# Patient Record
Sex: FEMALE | Race: WHITE | NOT HISPANIC OR LATINO | Employment: OTHER | URBAN - METROPOLITAN AREA
[De-identification: names, ages, dates, MRNs, and addresses within clinical notes are randomized per-mention and may not be internally consistent; named-entity substitution may affect disease eponyms.]

---

## 2017-08-22 ENCOUNTER — PREPPED CHART (OUTPATIENT)
Dept: URBAN - METROPOLITAN AREA CLINIC 39 | Facility: CLINIC | Age: 82
End: 2017-08-22

## 2018-02-21 NOTE — PROCEDURE NOTE: SURGICAL
<p>Prior to commencing surgery patient identification, surgical procedure, site, and side were confirmed by Dr. Tuyet Quigley. Following topical proparacaine anesthesia, the patient was positioned at the YAG laser, a contact lens coupled to the cornea with methylcellulose and an axial posterior capsulotomy performed without complication using 3.0 Mj x 49. Excess methylcellulose was washed from the eye, one drop of Alphagan was instilled and the patient returned to the holding area having tolerated the procedure well and without complication. </p><p>MRN [id number]</p>

## 2018-04-12 ENCOUNTER — ESTABLISHED COMPREHENSIVE EXAM (OUTPATIENT)
Dept: URBAN - METROPOLITAN AREA CLINIC 39 | Facility: CLINIC | Age: 83
End: 2018-04-12

## 2018-04-12 DIAGNOSIS — H35.3130: ICD-10-CM

## 2018-04-12 DIAGNOSIS — H35.373: ICD-10-CM

## 2018-04-12 DIAGNOSIS — H43.813: ICD-10-CM

## 2018-04-12 DIAGNOSIS — H26.492: ICD-10-CM

## 2018-04-12 PROCEDURE — 1036F TOBACCO NON-USER: CPT

## 2018-04-12 PROCEDURE — 92014 COMPRE OPH EXAM EST PT 1/>: CPT

## 2018-04-12 PROCEDURE — 92134 CPTRZ OPH DX IMG PST SGM RTA: CPT

## 2018-04-12 PROCEDURE — 2019F DILATED MACUL EXAM DONE: CPT

## 2018-04-12 PROCEDURE — 92015 DETERMINE REFRACTIVE STATE: CPT

## 2018-04-12 PROCEDURE — 4177F TALK PT/CRGVR RE AREDS PREV: CPT

## 2018-04-12 PROCEDURE — G8785 BP SCRN NO PERF AT INTERVAL: HCPCS

## 2018-04-12 PROCEDURE — G8427 DOCREV CUR MEDS BY ELIG CLIN: HCPCS

## 2018-04-12 ASSESSMENT — VISUAL ACUITY
OS_SC: J8
OD_SC: J1+
OS_SC: 20/50-1

## 2018-04-12 ASSESSMENT — TONOMETRY
OS_IOP_MMHG: 15
OD_IOP_MMHG: 15

## 2018-07-25 ENCOUNTER — ESTABLISHED COMPREHENSIVE EXAM (OUTPATIENT)
Dept: URBAN - METROPOLITAN AREA CLINIC 39 | Facility: CLINIC | Age: 83
End: 2018-07-25

## 2018-07-25 DIAGNOSIS — H10.12: ICD-10-CM

## 2018-07-25 DIAGNOSIS — H01.135: ICD-10-CM

## 2018-07-25 PROCEDURE — G8427 DOCREV CUR MEDS BY ELIG CLIN: HCPCS

## 2018-07-25 PROCEDURE — G8785 BP SCRN NO PERF AT INTERVAL: HCPCS

## 2018-07-25 PROCEDURE — 1036F TOBACCO NON-USER: CPT

## 2018-07-25 PROCEDURE — 92012 INTRM OPH EXAM EST PATIENT: CPT

## 2018-07-25 ASSESSMENT — TONOMETRY
OS_IOP_MMHG: 12
OD_IOP_MMHG: 18

## 2018-07-25 ASSESSMENT — VISUAL ACUITY
OD_PH: 20/50
OS_SC: 20/40
OD_SC: 20/200

## 2019-02-13 ENCOUNTER — EST. PATIENT EMERGENCY (OUTPATIENT)
Dept: URBAN - METROPOLITAN AREA CLINIC 39 | Facility: CLINIC | Age: 84
End: 2019-02-13

## 2019-02-13 DIAGNOSIS — H00.014: ICD-10-CM

## 2019-02-13 PROCEDURE — 92012 INTRM OPH EXAM EST PATIENT: CPT

## 2019-02-13 ASSESSMENT — TONOMETRY
OS_IOP_MMHG: 16
OD_IOP_MMHG: 14

## 2019-02-13 ASSESSMENT — VISUAL ACUITY
OS_SC: 20/40
OD_SC: CF 6FT

## 2019-04-11 ENCOUNTER — ESTABLISHED COMPREHENSIVE EXAM (OUTPATIENT)
Dept: URBAN - METROPOLITAN AREA CLINIC 39 | Facility: CLINIC | Age: 84
End: 2019-04-11

## 2019-04-11 DIAGNOSIS — H26.492: ICD-10-CM

## 2019-04-11 DIAGNOSIS — H35.3130: ICD-10-CM

## 2019-04-11 DIAGNOSIS — H43.813: ICD-10-CM

## 2019-04-11 DIAGNOSIS — H10.12: ICD-10-CM

## 2019-04-11 DIAGNOSIS — H35.373: ICD-10-CM

## 2019-04-11 DIAGNOSIS — H01.135: ICD-10-CM

## 2019-04-11 PROCEDURE — 92014 COMPRE OPH EXAM EST PT 1/>: CPT

## 2019-04-11 PROCEDURE — 92134 CPTRZ OPH DX IMG PST SGM RTA: CPT

## 2019-04-11 PROCEDURE — 92015 DETERMINE REFRACTIVE STATE: CPT

## 2019-04-11 ASSESSMENT — VISUAL ACUITY
OS_SC: <J12
OD_SC: CF 6FT
OU_SC: 20/40-2
OS_SC: 20/40-2
OU_SC: J1
OD_SC: J1

## 2020-04-22 ENCOUNTER — FOLLOW UP (OUTPATIENT)
Dept: URBAN - METROPOLITAN AREA CLINIC 39 | Facility: CLINIC | Age: 85
End: 2020-04-22

## 2020-04-22 DIAGNOSIS — H52.4: ICD-10-CM

## 2020-04-22 DIAGNOSIS — H43.813: ICD-10-CM

## 2020-04-22 DIAGNOSIS — H52.203: ICD-10-CM

## 2020-04-22 DIAGNOSIS — H26.492: ICD-10-CM

## 2020-04-22 DIAGNOSIS — H52.11: ICD-10-CM

## 2020-04-22 DIAGNOSIS — H35.373: ICD-10-CM

## 2020-04-22 DIAGNOSIS — H35.3130: ICD-10-CM

## 2020-04-22 PROCEDURE — 92015 DETERMINE REFRACTIVE STATE: CPT

## 2020-04-22 PROCEDURE — 92134 CPTRZ OPH DX IMG PST SGM RTA: CPT

## 2020-04-22 PROCEDURE — 92014 COMPRE OPH EXAM EST PT 1/>: CPT

## 2020-04-22 ASSESSMENT — VISUAL ACUITY
OD_SC: J1 @ 11"
OU_SC: J1 @ 11"
OU_SC: 20/40-2
OD_SC: 20/400
OS_SC: 20/40-2
OS_SC: J12

## 2020-04-22 ASSESSMENT — TONOMETRY
OD_IOP_MMHG: 16
OS_IOP_MMHG: 13

## 2021-04-06 ENCOUNTER — ESTABLISHED COMPREHENSIVE EXAM (OUTPATIENT)
Dept: URBAN - METROPOLITAN AREA CLINIC 39 | Facility: CLINIC | Age: 86
End: 2021-04-06

## 2021-04-06 DIAGNOSIS — H35.3130: ICD-10-CM

## 2021-04-06 DIAGNOSIS — H26.492: ICD-10-CM

## 2021-04-06 DIAGNOSIS — H35.373: ICD-10-CM

## 2021-04-06 DIAGNOSIS — H43.813: ICD-10-CM

## 2021-04-06 PROCEDURE — 92015 DETERMINE REFRACTIVE STATE: CPT

## 2021-04-06 PROCEDURE — 92014 COMPRE OPH EXAM EST PT 1/>: CPT

## 2021-04-06 PROCEDURE — 92134 CPTRZ OPH DX IMG PST SGM RTA: CPT

## 2021-04-06 ASSESSMENT — KERATOMETRY
OS_K2POWER_DIOPTERS: 45.75
OD_K1POWER_DIOPTERS: 45.50
OD_AXISANGLE_DEGREES: 81
OS_K1POWER_DIOPTERS: 45.25
OD_K2POWER_DIOPTERS: 45.75
OD_AXISANGLE2_DEGREES: 171
OS_AXISANGLE2_DEGREES: 17
OS_AXISANGLE_DEGREES: 107

## 2021-04-06 ASSESSMENT — VISUAL ACUITY
OU_SC: 20/40-2
OU_CC: J1
OU_SC: J1+
OS_SC: 20/50
OD_SC: J1+
OD_CC: J1
OS_SC: <J12
OS_CC: J1
OD_SC: CF 6FT

## 2021-04-06 ASSESSMENT — TONOMETRY
OD_IOP_MMHG: 18
OS_IOP_MMHG: 13

## 2021-10-13 ENCOUNTER — EST. PATIENT EMERGENCY (OUTPATIENT)
Dept: URBAN - METROPOLITAN AREA CLINIC 42 | Facility: CLINIC | Age: 86
End: 2021-10-13

## 2021-10-13 DIAGNOSIS — H35.3130: ICD-10-CM

## 2021-10-13 PROCEDURE — 99211 OFF/OP EST MAY X REQ PHY/QHP: CPT

## 2021-10-13 ASSESSMENT — KERATOMETRY
OS_K1POWER_DIOPTERS: 45.25
OD_K1POWER_DIOPTERS: 45.50
OD_AXISANGLE2_DEGREES: 171
OS_K2POWER_DIOPTERS: 45.75
OD_AXISANGLE_DEGREES: 81
OD_K2POWER_DIOPTERS: 45.75
OS_AXISANGLE2_DEGREES: 17
OS_AXISANGLE_DEGREES: 107

## 2021-10-13 ASSESSMENT — TONOMETRY
OD_IOP_MMHG: 17
OS_IOP_MMHG: 14

## 2021-10-13 ASSESSMENT — VISUAL ACUITY
OU_SC: 20/40-1
OS_SC: 20/40-1
OD_SC: CF 5FT

## 2022-03-01 ENCOUNTER — COMPREHENSIVE EXAM (OUTPATIENT)
Dept: URBAN - METROPOLITAN AREA CLINIC 39 | Facility: CLINIC | Age: 87
End: 2022-03-01

## 2022-03-01 DIAGNOSIS — H35.373: ICD-10-CM

## 2022-03-01 DIAGNOSIS — H52.4: ICD-10-CM

## 2022-03-01 DIAGNOSIS — H52.11: ICD-10-CM

## 2022-03-01 DIAGNOSIS — H35.3130: ICD-10-CM

## 2022-03-01 DIAGNOSIS — H52.223: ICD-10-CM

## 2022-03-01 DIAGNOSIS — H26.492: ICD-10-CM

## 2022-03-01 DIAGNOSIS — H43.813: ICD-10-CM

## 2022-03-01 PROCEDURE — 92015 DETERMINE REFRACTIVE STATE: CPT

## 2022-03-01 PROCEDURE — 92014 COMPRE OPH EXAM EST PT 1/>: CPT

## 2022-03-01 PROCEDURE — 92250 FUNDUS PHOTOGRAPHY W/I&R: CPT

## 2022-03-01 ASSESSMENT — KERATOMETRY
OS_K1POWER_DIOPTERS: 45.25
OS_AXISANGLE_DEGREES: 107
OD_AXISANGLE_DEGREES: 81
OD_AXISANGLE2_DEGREES: 171
OS_K2POWER_DIOPTERS: 45.75
OS_AXISANGLE2_DEGREES: 17
OD_K1POWER_DIOPTERS: 45.50
OD_K2POWER_DIOPTERS: 45.75

## 2022-03-01 ASSESSMENT — TONOMETRY
OD_IOP_MMHG: 17
OS_IOP_MMHG: 16

## 2022-03-01 ASSESSMENT — VISUAL ACUITY
OU_SC: 20/40-2
OD_SC: 20/70
OS_SC: J10
OS_SC: 20/40-2
OU_SC: J1+
OD_SC: J1+

## 2023-01-06 ENCOUNTER — ESTABLISHED PATIENT (OUTPATIENT)
Dept: URBAN - METROPOLITAN AREA CLINIC 39 | Facility: CLINIC | Age: 88
End: 2023-01-06

## 2023-01-06 DIAGNOSIS — H35.3130: ICD-10-CM

## 2023-01-06 DIAGNOSIS — H52.11: ICD-10-CM

## 2023-01-06 DIAGNOSIS — H26.492: ICD-10-CM

## 2023-01-06 DIAGNOSIS — H35.373: ICD-10-CM

## 2023-01-06 DIAGNOSIS — H52.4: ICD-10-CM

## 2023-01-06 DIAGNOSIS — H43.813: ICD-10-CM

## 2023-01-06 PROCEDURE — 92015 DETERMINE REFRACTIVE STATE: CPT

## 2023-01-06 PROCEDURE — 92134 CPTRZ OPH DX IMG PST SGM RTA: CPT

## 2023-01-06 PROCEDURE — 92014 COMPRE OPH EXAM EST PT 1/>: CPT

## 2023-01-06 ASSESSMENT — KERATOMETRY
OS_AXISANGLE_DEGREES: 107
OD_K1POWER_DIOPTERS: 45.50
OS_K1POWER_DIOPTERS: 45.25
OD_AXISANGLE_DEGREES: 81
OS_AXISANGLE2_DEGREES: 17
OD_K2POWER_DIOPTERS: 45.75
OS_K2POWER_DIOPTERS: 45.75
OD_AXISANGLE2_DEGREES: 171

## 2023-01-06 ASSESSMENT — VISUAL ACUITY
OD_SC: J4
OS_SC: J12
OS_SC: 20/70
OD_SC: 20/400

## 2023-01-06 ASSESSMENT — TONOMETRY
OD_IOP_MMHG: 24
OS_IOP_MMHG: 16

## 2023-04-13 NOTE — PATIENT DISCUSSION
Discussed the risks/benefits of laser capsulotomy. Laser recommended. Patient elects to proceed. DISPLAY PLAN FREE TEXT

## 2023-06-06 ENCOUNTER — FOLLOW UP (OUTPATIENT)
Dept: URBAN - METROPOLITAN AREA CLINIC 39 | Facility: CLINIC | Age: 88
End: 2023-06-06

## 2023-06-06 DIAGNOSIS — H40.013: ICD-10-CM

## 2023-06-06 PROCEDURE — 92083 EXTENDED VISUAL FIELD XM: CPT

## 2023-06-06 PROCEDURE — 92250 FUNDUS PHOTOGRAPHY W/I&R: CPT

## 2023-06-06 PROCEDURE — 92012 INTRM OPH EXAM EST PATIENT: CPT

## 2023-06-06 RX ORDER — LATANOPROST 50 UG/ML
1 SOLUTION/ DROPS OPHTHALMIC EVERY EVENING
Start: 2023-06-06

## 2023-06-06 ASSESSMENT — KERATOMETRY
OS_AXISANGLE2_DEGREES: 17
OD_AXISANGLE_DEGREES: 81
OS_K2POWER_DIOPTERS: 45.75
OS_K1POWER_DIOPTERS: 45.25
OD_AXISANGLE2_DEGREES: 171
OD_K1POWER_DIOPTERS: 45.50
OS_AXISANGLE_DEGREES: 107
OD_K2POWER_DIOPTERS: 45.75

## 2023-06-06 ASSESSMENT — VISUAL ACUITY
OD_SC: 20/400
OD_SC: J1
OS_SC: 20/40-2

## 2023-06-06 ASSESSMENT — TONOMETRY
OD_IOP_MMHG: 25
OS_IOP_MMHG: 16

## 2023-06-13 ENCOUNTER — FOLLOW UP (OUTPATIENT)
Dept: URBAN - METROPOLITAN AREA CLINIC 39 | Facility: CLINIC | Age: 88
End: 2023-06-13

## 2023-06-13 DIAGNOSIS — H40.013: ICD-10-CM

## 2023-06-13 PROCEDURE — 92012 INTRM OPH EXAM EST PATIENT: CPT

## 2023-06-13 ASSESSMENT — KERATOMETRY
OS_AXISANGLE2_DEGREES: 17
OD_K1POWER_DIOPTERS: 45.50
OS_K2POWER_DIOPTERS: 45.75
OS_K1POWER_DIOPTERS: 45.25
OD_K2POWER_DIOPTERS: 45.75
OD_AXISANGLE_DEGREES: 81
OD_AXISANGLE2_DEGREES: 171
OS_AXISANGLE_DEGREES: 107

## 2023-06-13 ASSESSMENT — VISUAL ACUITY
OU_SC: 20/30-2
OD_SC: 20/400
OS_SC: 20/30-2

## 2023-06-13 ASSESSMENT — TONOMETRY
OS_IOP_MMHG: 16
OD_IOP_MMHG: 22

## 2023-06-20 ENCOUNTER — CONSULTATION/EVALUATION (OUTPATIENT)
Dept: URBAN - METROPOLITAN AREA CLINIC 39 | Facility: CLINIC | Age: 88
End: 2023-06-20

## 2023-06-20 DIAGNOSIS — H40.1131: ICD-10-CM

## 2023-06-20 PROCEDURE — 92020 GONIOSCOPY: CPT

## 2023-06-20 PROCEDURE — 92004 COMPRE OPH EXAM NEW PT 1/>: CPT

## 2023-06-20 PROCEDURE — 92250 FUNDUS PHOTOGRAPHY W/I&R: CPT

## 2023-06-20 PROCEDURE — 65855 TRABECULOPLASTY LASER SURG: CPT

## 2023-06-20 PROCEDURE — 76514 ECHO EXAM OF EYE THICKNESS: CPT

## 2023-06-20 RX ORDER — PREDNISOLONE ACETATE 10 MG/ML: 1 SUSPENSION/ DROPS OPHTHALMIC

## 2023-06-20 ASSESSMENT — KERATOMETRY
OS_AXISANGLE2_DEGREES: 17
OD_K2POWER_DIOPTERS: 45.75
OS_AXISANGLE_DEGREES: 107
OS_K2POWER_DIOPTERS: 45.75
OS_K1POWER_DIOPTERS: 45.25
OD_AXISANGLE2_DEGREES: 171
OD_K1POWER_DIOPTERS: 45.50
OD_AXISANGLE_DEGREES: 81

## 2023-06-20 ASSESSMENT — TONOMETRY
OS_IOP_MMHG: 16
OD_IOP_MMHG: 24

## 2023-06-20 ASSESSMENT — VISUAL ACUITY
OD_SC: 20/400
OS_SC: 20/30-1

## 2023-06-20 ASSESSMENT — PACHYMETRY
OD_CT_UM: 516
OS_CT_UM: 521

## 2023-07-25 ENCOUNTER — FOLLOW UP (OUTPATIENT)
Dept: URBAN - METROPOLITAN AREA CLINIC 39 | Facility: CLINIC | Age: 88
End: 2023-07-25

## 2023-07-25 DIAGNOSIS — H40.1131: ICD-10-CM

## 2023-07-25 PROCEDURE — 92012 INTRM OPH EXAM EST PATIENT: CPT

## 2023-07-25 ASSESSMENT — TONOMETRY
OS_IOP_MMHG: 14
OD_IOP_MMHG: 19

## 2023-07-25 ASSESSMENT — KERATOMETRY
OS_K1POWER_DIOPTERS: 45.25
OD_AXISANGLE_DEGREES: 81
OS_AXISANGLE2_DEGREES: 17
OD_K1POWER_DIOPTERS: 45.50
OS_AXISANGLE_DEGREES: 107
OS_K2POWER_DIOPTERS: 45.75
OD_AXISANGLE2_DEGREES: 171
OD_K2POWER_DIOPTERS: 45.75

## 2023-07-25 ASSESSMENT — VISUAL ACUITY
OD_SC: 20/400
OS_SC: 20/30-2

## 2023-10-24 ENCOUNTER — FOLLOW UP (OUTPATIENT)
Dept: URBAN - METROPOLITAN AREA CLINIC 39 | Facility: CLINIC | Age: 88
End: 2023-10-24

## 2023-10-24 DIAGNOSIS — H40.1131: ICD-10-CM

## 2023-10-24 PROCEDURE — 92133 CPTRZD OPH DX IMG PST SGM ON: CPT | Mod: 25

## 2023-10-24 PROCEDURE — 92012 INTRM OPH EXAM EST PATIENT: CPT

## 2023-10-24 ASSESSMENT — KERATOMETRY
OS_AXISANGLE_DEGREES: 107
OS_AXISANGLE2_DEGREES: 17
OD_K1POWER_DIOPTERS: 45.50
OS_K1POWER_DIOPTERS: 45.25
OS_K2POWER_DIOPTERS: 45.75
OD_AXISANGLE2_DEGREES: 171
OD_K2POWER_DIOPTERS: 45.75
OD_AXISANGLE_DEGREES: 81

## 2023-10-24 ASSESSMENT — VISUAL ACUITY
OD_CC: 20/40
OD_PH: 20/30-2
OS_CC: 20/40-2

## 2023-10-24 ASSESSMENT — TONOMETRY
OS_IOP_MMHG: 12
OD_IOP_MMHG: 14

## 2024-03-19 ENCOUNTER — FOLLOW UP (OUTPATIENT)
Dept: URBAN - METROPOLITAN AREA CLINIC 39 | Facility: CLINIC | Age: 89
End: 2024-03-19

## 2024-03-19 DIAGNOSIS — H40.1131: ICD-10-CM

## 2024-03-19 PROCEDURE — 92012 INTRM OPH EXAM EST PATIENT: CPT

## 2024-03-19 ASSESSMENT — KERATOMETRY
OD_K2POWER_DIOPTERS: 45.75
OD_AXISANGLE2_DEGREES: 171
OD_AXISANGLE_DEGREES: 81
OD_K1POWER_DIOPTERS: 45.50
OS_K2POWER_DIOPTERS: 45.75
OS_AXISANGLE_DEGREES: 107
OS_AXISANGLE2_DEGREES: 17
OS_K1POWER_DIOPTERS: 45.25

## 2024-03-19 ASSESSMENT — VISUAL ACUITY
OU_SC: 20/70-1
OS_SC: 20/70-1
OD_SC: 20/400
OD_PH: 20/80-1

## 2024-03-19 ASSESSMENT — TONOMETRY
OD_IOP_MMHG: 17
OS_IOP_MMHG: 14

## 2024-07-18 ENCOUNTER — FOLLOW UP (OUTPATIENT)
Dept: URBAN - METROPOLITAN AREA CLINIC 39 | Facility: CLINIC | Age: 89
End: 2024-07-18

## 2024-07-18 DIAGNOSIS — H40.1131: ICD-10-CM

## 2024-07-18 DIAGNOSIS — H35.3130: ICD-10-CM

## 2024-07-18 PROCEDURE — 92012 INTRM OPH EXAM EST PATIENT: CPT

## 2024-07-18 PROCEDURE — 92250 FUNDUS PHOTOGRAPHY W/I&R: CPT | Mod: 25

## 2024-07-18 ASSESSMENT — KERATOMETRY
OS_K2POWER_DIOPTERS: 45.75
OD_AXISANGLE_DEGREES: 81
OD_AXISANGLE2_DEGREES: 171
OS_AXISANGLE_DEGREES: 107
OD_K1POWER_DIOPTERS: 45.50
OD_K2POWER_DIOPTERS: 45.75
OS_AXISANGLE2_DEGREES: 17
OS_K1POWER_DIOPTERS: 45.25

## 2024-07-18 ASSESSMENT — VISUAL ACUITY
OD_SC: 20/400
OS_PH: 20/50+1
OD_PH: 20/60-1
OS_SC: 20/50-2

## 2024-07-18 ASSESSMENT — TONOMETRY
OS_IOP_MMHG: 13
OD_IOP_MMHG: 15

## 2025-04-24 ENCOUNTER — COMPREHENSIVE EXAM (OUTPATIENT)
Age: OVER 89
End: 2025-04-24

## 2025-04-24 DIAGNOSIS — H43.813: ICD-10-CM

## 2025-04-24 DIAGNOSIS — H40.1131: ICD-10-CM

## 2025-04-24 DIAGNOSIS — H52.4: ICD-10-CM

## 2025-04-24 DIAGNOSIS — H52.11: ICD-10-CM

## 2025-04-24 DIAGNOSIS — H35.3130: ICD-10-CM

## 2025-04-24 DIAGNOSIS — H26.492: ICD-10-CM

## 2025-04-24 DIAGNOSIS — H52.203: ICD-10-CM

## 2025-04-24 DIAGNOSIS — H52.02: ICD-10-CM

## 2025-04-24 DIAGNOSIS — H35.373: ICD-10-CM

## 2025-04-24 PROCEDURE — 92015 DETERMINE REFRACTIVE STATE: CPT

## 2025-04-24 PROCEDURE — 92014 COMPRE OPH EXAM EST PT 1/>: CPT

## 2025-04-24 PROCEDURE — 92083 EXTENDED VISUAL FIELD XM: CPT | Mod: 25
